# Patient Record
Sex: MALE | Race: BLACK OR AFRICAN AMERICAN
[De-identification: names, ages, dates, MRNs, and addresses within clinical notes are randomized per-mention and may not be internally consistent; named-entity substitution may affect disease eponyms.]

---

## 2018-03-14 ENCOUNTER — HOSPITAL ENCOUNTER (EMERGENCY)
Dept: HOSPITAL 12 - ER | Age: 33
LOS: 1 days | Discharge: HOME | End: 2018-03-15
Payer: MEDICARE

## 2018-03-14 VITALS — BODY MASS INDEX: 20.99 KG/M2 | HEIGHT: 72 IN | WEIGHT: 155 LBS

## 2018-03-14 DIAGNOSIS — Z91.048: ICD-10-CM

## 2018-03-14 DIAGNOSIS — S67.01XA: Primary | ICD-10-CM
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
## 2018-03-14 DIAGNOSIS — Z79.899: ICD-10-CM
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
## 2018-03-14 PROCEDURE — A4663 DIALYSIS BLOOD PRESSURE CUFF: HCPCS

## 2018-03-14 PROCEDURE — 29125 APPL SHORT ARM SPLINT STATIC: CPT

## 2018-03-14 PROCEDURE — 73140 X-RAY EXAM OF FINGER(S): CPT

## 2018-03-14 PROCEDURE — 99284 EMERGENCY DEPT VISIT MOD MDM: CPT

## 2018-03-15 VITALS — DIASTOLIC BLOOD PRESSURE: 87 MMHG | SYSTOLIC BLOOD PRESSURE: 136 MMHG

## 2018-03-15 NOTE — NUR
Patient discharged to home in stable conditon.  Written and verbal after care 
instructions given. 

Patient verbalizes understanding of instructions. Patient reports reduced thumb 
pain. Patient able to ambulate unassisted with steady gait. Patient left with 
all personal belongings.

## 2018-10-12 ENCOUNTER — HOSPITAL ENCOUNTER (EMERGENCY)
Age: 33
Discharge: HOME | End: 2018-10-12

## 2018-10-12 ENCOUNTER — HOSPITAL ENCOUNTER (EMERGENCY)
Dept: HOSPITAL 91 - E/R | Age: 33
Discharge: HOME | End: 2018-10-12
Payer: MEDICARE

## 2018-10-12 DIAGNOSIS — F14.10: Primary | ICD-10-CM

## 2018-10-12 DIAGNOSIS — F12.10: ICD-10-CM

## 2018-10-12 DIAGNOSIS — R40.2142: ICD-10-CM

## 2018-10-12 DIAGNOSIS — R40.2252: ICD-10-CM

## 2018-10-12 DIAGNOSIS — R40.2362: ICD-10-CM

## 2018-10-12 LAB
ACETAMINOPHEN: < 10 UG/ML (ref 10–30)
ADD MAN DIFF?: NO
ADD UMIC: YES
ALANINE AMINOTRANSFERASE: 23 IU/L (ref 13–69)
ALBUMIN/GLOBULIN RATIO: 1.58
ALBUMIN: 4.6 G/DL (ref 3.3–4.9)
ALKALINE PHOSPHATASE: 85 IU/L (ref 42–121)
AMPHETAMINE/METHAMPHETAMINE: POSITIVE
ANION GAP: 11 (ref 8–16)
ASPARTATE AMINO TRANSFERASE: 31 IU/L (ref 15–46)
BASOPHIL #: 0 10^3/UL (ref 0–0.1)
BASOPHILS %: 0.3 % (ref 0–2)
BILIRUBIN,DIRECT: 0 MG/DL (ref 0–0.2)
BILIRUBIN,TOTAL: 0.5 MG/DL (ref 0.2–1.3)
BLOOD UREA NITROGEN: 9 MG/DL (ref 7–20)
CALCIUM: 10.1 MG/DL (ref 8.4–10.2)
CARBON DIOXIDE: 28 MMOL/L (ref 21–31)
CHLORIDE: 107 MMOL/L (ref 97–110)
CREATININE: 0.86 MG/DL (ref 0.61–1.24)
EOSINOPHILS #: 0 10^3/UL (ref 0–0.5)
EOSINOPHILS %: 0.6 % (ref 0–7)
ETHANOL: < 10 MG/DL
GLOBULIN: 2.9 G/DL (ref 1.3–3.2)
GLUCOSE: 107 MG/DL (ref 70–220)
HEMATOCRIT: 44.3 % (ref 42–52)
HEMOGLOBIN: 15 G/DL (ref 14–18)
IMMATURE GRANS #M: 0.01 10^3/UL (ref 0–0.03)
IMMATURE GRANS % (M): 0.1 % (ref 0–0.43)
LYMPHOCYTES #: 1.8 10^3/UL (ref 0.8–2.9)
LYMPHOCYTES %: 25.7 % (ref 15–51)
MEAN CORPUSCULAR HEMOGLOBIN: 29.9 PG (ref 29–33)
MEAN CORPUSCULAR HGB CONC: 33.9 G/DL (ref 32–37)
MEAN CORPUSCULAR VOLUME: 88.2 FL (ref 82–101)
MEAN PLATELET VOLUME: 8.8 FL (ref 7.4–10.4)
MONOCYTE #: 0.5 10^3/UL (ref 0.3–0.9)
MONOCYTES %: 6.5 % (ref 0–11)
NEUTROPHIL #: 4.6 10^3/UL (ref 1.6–7.5)
NEUTROPHILS %: 66.8 % (ref 39–77)
NUCLEATED RED BLOOD CELLS #: 0 10^3/UL (ref 0–0)
NUCLEATED RED BLOOD CELLS%: 0 /100WBC (ref 0–0)
PLATELET COUNT: 269 10^3/UL (ref 140–415)
POTASSIUM: 4.6 MMOL/L (ref 3.5–5.1)
RED BLOOD COUNT: 5.02 10^6/UL (ref 4.7–6.1)
RED CELL DISTRIBUTION WIDTH: 12.6 % (ref 11.5–14.5)
SALICYLATE: < 1 MG/DL (ref 5–30)
SODIUM: 141 MMOL/L (ref 135–144)
TOTAL PROTEIN: 7.5 G/DL (ref 6.1–8.1)
UR AMORPHOUS CRYSTAL: (no result) /HPF
UR ASCORBIC ACID: NEGATIVE MG/DL
UR BILIRUBIN (DIP): NEGATIVE MG/DL
UR BLOOD (DIP): NEGATIVE MG/DL
UR CLARITY: (no result)
UR COLOR: YELLOW
UR GLUCOSE (DIP): NEGATIVE MG/DL
UR HYALINE CAST: (no result) /HPF
UR KETONES (DIP): NEGATIVE MG/DL
UR LEUKOCYTE ESTERASE (DIP): NEGATIVE LEU/UL
UR MUCUS: (no result) /HPF
UR NITRITE (DIP): NEGATIVE MG/DL
UR PH (DIP): 7 (ref 5–9)
UR RBC: 0 /HPF (ref 0–5)
UR SPECIFIC GRAVITY (DIP): 1.02 (ref 1–1.03)
UR TOTAL PROTEIN (DIP): NEGATIVE MG/DL
UR UROBILINOGEN (DIP): (no result) MG/DL
UR WBC: 0 /HPF (ref 0–5)
WHITE BLOOD COUNT: 6.9 10^3/UL (ref 4.8–10.8)

## 2018-10-12 PROCEDURE — 80307 DRUG TEST PRSMV CHEM ANLYZR: CPT

## 2018-10-12 PROCEDURE — 99284 EMERGENCY DEPT VISIT MOD MDM: CPT

## 2018-10-12 PROCEDURE — 36415 COLL VENOUS BLD VENIPUNCTURE: CPT

## 2018-10-12 PROCEDURE — 80053 COMPREHEN METABOLIC PANEL: CPT

## 2018-10-12 PROCEDURE — 85025 COMPLETE CBC W/AUTO DIFF WBC: CPT

## 2018-10-12 PROCEDURE — 81001 URINALYSIS AUTO W/SCOPE: CPT

## 2018-10-12 PROCEDURE — 96374 THER/PROPH/DIAG INJ IV PUSH: CPT

## 2018-10-12 RX ADMIN — LORAZEPAM 1 MG: 2 INJECTION, SOLUTION INTRAMUSCULAR; INTRAVENOUS at 09:22

## 2018-10-12 RX ADMIN — THIAMINE HYDROCHLORIDE 1 MLS/HR: 100 INJECTION, SOLUTION INTRAMUSCULAR; INTRAVENOUS at 09:22

## 2018-12-14 ENCOUNTER — HOSPITAL ENCOUNTER (EMERGENCY)
Dept: HOSPITAL 91 - FTE | Age: 33
Discharge: HOME | End: 2018-12-14
Payer: MEDICARE

## 2018-12-14 ENCOUNTER — HOSPITAL ENCOUNTER (EMERGENCY)
Age: 33
Discharge: HOME | End: 2018-12-14

## 2018-12-14 DIAGNOSIS — R09.89: Primary | ICD-10-CM

## 2018-12-14 DIAGNOSIS — F17.210: ICD-10-CM

## 2018-12-14 PROCEDURE — 99283 EMERGENCY DEPT VISIT LOW MDM: CPT

## 2018-12-14 PROCEDURE — 70360 X-RAY EXAM OF NECK: CPT

## 2018-12-14 RX ADMIN — LORAZEPAM 1 MG: 1 TABLET ORAL at 12:14

## 2021-06-18 ENCOUNTER — HOSPITAL ENCOUNTER (EMERGENCY)
Dept: HOSPITAL 12 - ER | Age: 36
Discharge: HOME | End: 2021-06-18
Payer: COMMERCIAL

## 2021-06-18 VITALS — SYSTOLIC BLOOD PRESSURE: 129 MMHG | DIASTOLIC BLOOD PRESSURE: 82 MMHG

## 2021-06-18 VITALS — HEIGHT: 72 IN | WEIGHT: 155 LBS | BODY MASS INDEX: 20.99 KG/M2
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
## 2021-06-18 DIAGNOSIS — G40.909: ICD-10-CM

## 2021-06-18 DIAGNOSIS — Z91.030: ICD-10-CM

## 2021-06-18 DIAGNOSIS — S99.921A: Primary | ICD-10-CM

## 2021-06-18 DIAGNOSIS — F20.9: ICD-10-CM

## 2021-06-18 PROCEDURE — A4663 DIALYSIS BLOOD PRESSURE CUFF: HCPCS

## 2021-07-02 ENCOUNTER — HOSPITAL ENCOUNTER (EMERGENCY)
Dept: HOSPITAL 12 - ER | Age: 36
Discharge: HOME | End: 2021-07-02
Payer: COMMERCIAL

## 2021-07-02 VITALS — HEIGHT: 72 IN | WEIGHT: 160 LBS | BODY MASS INDEX: 21.67 KG/M2

## 2021-07-02 VITALS — SYSTOLIC BLOOD PRESSURE: 128 MMHG | DIASTOLIC BLOOD PRESSURE: 74 MMHG
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
## 2021-07-02 DIAGNOSIS — S80.11XA: Primary | ICD-10-CM
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
## 2021-07-02 DIAGNOSIS — Z91.030: ICD-10-CM

## 2021-07-02 DIAGNOSIS — Z87.81: ICD-10-CM

## 2021-07-02 DIAGNOSIS — F20.0: ICD-10-CM

## 2021-07-02 DIAGNOSIS — F17.210: ICD-10-CM

## 2021-07-02 PROCEDURE — A4663 DIALYSIS BLOOD PRESSURE CUFF: HCPCS

## 2021-07-02 NOTE — NUR
Patient discharged to home in stable condition.  Written and verbal after care 
instructions given. 

Patient verbalizes understanding of instructions. Stressed follow up or return 
to ER for worsening s/s. Ambulated from ER with stable gait. All belonings with 
patient. Driven home by mother.

## 2021-07-10 ENCOUNTER — HOSPITAL ENCOUNTER (EMERGENCY)
Dept: HOSPITAL 12 - ER | Age: 36
Discharge: HOME | End: 2021-07-10
Payer: COMMERCIAL

## 2021-07-10 VITALS — DIASTOLIC BLOOD PRESSURE: 69 MMHG | SYSTOLIC BLOOD PRESSURE: 131 MMHG

## 2021-07-10 VITALS — HEIGHT: 71 IN | BODY MASS INDEX: 22.4 KG/M2 | WEIGHT: 160 LBS

## 2021-07-10 DIAGNOSIS — F15.10: ICD-10-CM

## 2021-07-10 DIAGNOSIS — F41.9: Primary | ICD-10-CM

## 2021-07-10 DIAGNOSIS — F20.0: ICD-10-CM

## 2021-07-10 DIAGNOSIS — F17.210: ICD-10-CM

## 2021-07-10 DIAGNOSIS — Z91.030: ICD-10-CM

## 2021-07-10 PROCEDURE — A4663 DIALYSIS BLOOD PRESSURE CUFF: HCPCS

## 2021-07-10 NOTE — NUR
When giving the patient the 2mg ativan po and 10 mg zyprexa po, the patient 
dropped one of the ativan tablets (0.5 mg) and one of the zyprexa tablets (5mg) 
on the ground. The pills that dropped on the ground were wasted with charge 
nurse Valentín as witness. A new 0.5 mg ativan tablet and new 5mg zyprexa tablet 
were removed from the pyxis and given to the pt. In total the patient recieved 
2mg po ativan and 10mg po zyprexa as ordered.

## 2021-07-10 NOTE — NUR
Patient discharged to home in stable condition.  Written and verbal after care 
instructions given. 

Patient verbalizes understanding of instructions. Stressed follow up or return 
to ER for worsening s/s. Pt. walks with steady gait. Pt. is being driven home 
by his mother.

## 2021-07-10 NOTE — NUR
Pt came in today bib mother for insomnia, anxiety, paranoia for past 3 days. 
Pt. has dx of paranoid schitzoprenia and also has hx methamphetamine use. Pt. 
states he hasn't used methamphetamines for several days. Pt. appears anxious, 
his vss and he denies any other symptoms. yes

## 2021-08-03 ENCOUNTER — HOSPITAL ENCOUNTER (EMERGENCY)
Dept: HOSPITAL 12 - ER | Age: 36
Discharge: HOME | End: 2021-08-03
Payer: COMMERCIAL

## 2021-08-03 VITALS — WEIGHT: 165 LBS | HEIGHT: 69 IN | BODY MASS INDEX: 24.44 KG/M2

## 2021-08-03 VITALS — SYSTOLIC BLOOD PRESSURE: 118 MMHG | DIASTOLIC BLOOD PRESSURE: 62 MMHG

## 2021-08-03 DIAGNOSIS — F20.0: ICD-10-CM
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
## 2021-08-03 DIAGNOSIS — Z79.899: ICD-10-CM

## 2021-08-03 DIAGNOSIS — Z91.030: ICD-10-CM

## 2021-08-03 DIAGNOSIS — S82.301A: Primary | ICD-10-CM

## 2021-08-03 DIAGNOSIS — F17.210: ICD-10-CM
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
## 2021-08-03 PROCEDURE — A4663 DIALYSIS BLOOD PRESSURE CUFF: HCPCS

## 2021-08-03 NOTE — NUR
Patient discharged to home in stable condition with mother taking patient home. 
 Written and verbal after care instructions given. 

Patient verbalizes understanding of instructions. Stressed follow up or return 
to ER for worsening s/s.

## 2022-05-06 ENCOUNTER — HOSPITAL ENCOUNTER (EMERGENCY)
Dept: HOSPITAL 12 - ER | Age: 37
Discharge: HOME | End: 2022-05-06
Payer: COMMERCIAL

## 2022-05-06 VITALS — SYSTOLIC BLOOD PRESSURE: 119 MMHG | DIASTOLIC BLOOD PRESSURE: 69 MMHG

## 2022-05-06 VITALS — BODY MASS INDEX: 25.06 KG/M2 | WEIGHT: 185 LBS | HEIGHT: 72 IN

## 2022-05-06 DIAGNOSIS — M25.462: ICD-10-CM

## 2022-05-06 DIAGNOSIS — F20.0: ICD-10-CM

## 2022-05-06 DIAGNOSIS — Z91.014: ICD-10-CM

## 2022-05-06 DIAGNOSIS — S80.10XA: ICD-10-CM
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
## 2022-05-06 DIAGNOSIS — S82.65XA: Primary | ICD-10-CM

## 2022-05-06 DIAGNOSIS — Z79.899: ICD-10-CM
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
## 2022-05-06 DIAGNOSIS — F17.210: ICD-10-CM

## 2022-05-06 PROCEDURE — A4663 DIALYSIS BLOOD PRESSURE CUFF: HCPCS

## 2022-05-22 ENCOUNTER — HOSPITAL ENCOUNTER (EMERGENCY)
Dept: HOSPITAL 12 - ER | Age: 37
LOS: 1 days | Discharge: HOME | End: 2022-05-23
Payer: COMMERCIAL

## 2022-05-22 VITALS — HEIGHT: 72 IN | WEIGHT: 165 LBS | BODY MASS INDEX: 22.35 KG/M2

## 2022-05-22 DIAGNOSIS — M25.572: ICD-10-CM
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
## 2022-05-22 DIAGNOSIS — S82.62XD: Primary | ICD-10-CM

## 2022-05-22 DIAGNOSIS — M25.571: ICD-10-CM

## 2022-05-22 DIAGNOSIS — F20.0: ICD-10-CM

## 2022-05-22 DIAGNOSIS — Z91.19: ICD-10-CM

## 2022-05-22 PROCEDURE — A4663 DIALYSIS BLOOD PRESSURE CUFF: HCPCS

## 2022-05-23 VITALS — SYSTOLIC BLOOD PRESSURE: 135 MMHG | DIASTOLIC BLOOD PRESSURE: 66 MMHG

## 2022-05-23 NOTE — NUR
Patient discharged to home in stable condition.  Written and verbal after care 
instructions given. 

Patient verbalizes understanding of instructions. Stressed follow up or return 
to ER for worsening s/s.

Patient out of ER with steady gait, no acute signs of distress, VSS, all 
belongings taken.

## 2022-08-25 ENCOUNTER — HOSPITAL ENCOUNTER (EMERGENCY)
Dept: HOSPITAL 54 - ER | Age: 37
Discharge: HOME | End: 2022-08-25
Payer: COMMERCIAL

## 2022-08-25 VITALS — BODY MASS INDEX: 23.03 KG/M2 | HEIGHT: 72 IN | WEIGHT: 170 LBS

## 2022-08-25 VITALS — SYSTOLIC BLOOD PRESSURE: 116 MMHG | DIASTOLIC BLOOD PRESSURE: 69 MMHG

## 2022-08-25 DIAGNOSIS — F12.90: ICD-10-CM

## 2022-08-25 DIAGNOSIS — R06.02: Primary | ICD-10-CM

## 2023-08-19 ENCOUNTER — HOSPITAL ENCOUNTER (EMERGENCY)
Dept: HOSPITAL 54 - ER | Age: 38
LOS: 1 days | Discharge: HOME | End: 2023-08-20
Payer: MEDICARE

## 2023-08-19 VITALS — BODY MASS INDEX: 20.45 KG/M2 | HEIGHT: 72 IN | WEIGHT: 151 LBS

## 2023-08-19 DIAGNOSIS — F20.9: ICD-10-CM

## 2023-08-19 DIAGNOSIS — M79.605: Primary | ICD-10-CM

## 2023-08-19 DIAGNOSIS — F17.200: ICD-10-CM

## 2023-08-20 VITALS — OXYGEN SATURATION: 97 % | TEMPERATURE: 98 F | DIASTOLIC BLOOD PRESSURE: 50 MMHG | SYSTOLIC BLOOD PRESSURE: 93 MMHG

## 2024-09-12 ENCOUNTER — HOSPITAL ENCOUNTER (EMERGENCY)
Dept: HOSPITAL 54 - ER | Age: 39
LOS: 1 days | Discharge: HOME | End: 2024-09-13
Payer: COMMERCIAL

## 2024-09-12 VITALS — BODY MASS INDEX: 20.99 KG/M2 | HEIGHT: 72 IN | WEIGHT: 155 LBS

## 2024-09-12 DIAGNOSIS — M54.2: ICD-10-CM

## 2024-09-12 DIAGNOSIS — Z76.89: ICD-10-CM

## 2024-09-12 DIAGNOSIS — F20.9: ICD-10-CM

## 2024-09-12 DIAGNOSIS — F17.210: ICD-10-CM

## 2024-09-12 DIAGNOSIS — F12.10: Primary | ICD-10-CM

## 2024-09-13 VITALS — TEMPERATURE: 99.3 F | OXYGEN SATURATION: 98 % | SYSTOLIC BLOOD PRESSURE: 134 MMHG | DIASTOLIC BLOOD PRESSURE: 66 MMHG
